# Patient Record
Sex: MALE | URBAN - METROPOLITAN AREA
[De-identification: names, ages, dates, MRNs, and addresses within clinical notes are randomized per-mention and may not be internally consistent; named-entity substitution may affect disease eponyms.]

---

## 2023-02-03 ENCOUNTER — APPOINTMENT (RX ONLY)
Dept: URBAN - METROPOLITAN AREA CLINIC 95 | Facility: CLINIC | Age: 83
Setting detail: DERMATOLOGY
End: 2023-02-03

## 2023-02-03 DIAGNOSIS — D492 NEOPLASM OF UNSPECIFIED NATURE OF BONE, SOFT TISSUE, AND SKIN: ICD-10-CM

## 2023-02-03 DIAGNOSIS — L21.8 OTHER SEBORRHEIC DERMATITIS: ICD-10-CM

## 2023-02-03 PROBLEM — R22.1 LOCALIZED SWELLING, MASS AND LUMP, NECK: Status: ACTIVE | Noted: 2023-02-03

## 2023-02-03 PROCEDURE — ? ORDER ULTRASOUND

## 2023-02-03 PROCEDURE — 99204 OFFICE O/P NEW MOD 45 MIN: CPT

## 2023-02-03 PROCEDURE — ? COUNSELING

## 2023-02-03 PROCEDURE — ? PRESCRIPTION

## 2023-02-03 RX ORDER — KETOCONAZOLE 20 MG/ML
SHAMPOO, SUSPENSION TOPICAL TIW
Qty: 120 | Refills: 3 | Status: ERX | COMMUNITY
Start: 2023-02-03

## 2023-02-03 RX ORDER — HYDROCORTISONE 25 MG/G
CREAM TOPICAL
Qty: 30 | Refills: 2 | Status: ERX | COMMUNITY
Start: 2023-02-03

## 2023-02-03 RX ADMIN — KETOCONAZOLE: 20 SHAMPOO, SUSPENSION TOPICAL at 00:00

## 2023-02-03 RX ADMIN — HYDROCORTISONE: 25 CREAM TOPICAL at 00:00

## 2023-02-03 ASSESSMENT — LOCATION ZONE DERM
LOCATION ZONE: NECK
LOCATION ZONE: SCALP
LOCATION ZONE: FACE

## 2023-02-03 ASSESSMENT — LOCATION SIMPLE DESCRIPTION DERM
LOCATION SIMPLE: LEFT SCALP
LOCATION SIMPLE: LEFT EYEBROW
LOCATION SIMPLE: POSTERIOR NECK

## 2023-02-03 ASSESSMENT — LOCATION DETAILED DESCRIPTION DERM
LOCATION DETAILED: LEFT LATERAL NECK
LOCATION DETAILED: LEFT MEDIAL FRONTAL SCALP
LOCATION DETAILED: LEFT CENTRAL EYEBROW

## 2023-02-03 NOTE — PROCEDURE: ORDER ULTRASOUND
Priority: normal
Provider: Curt Sanchez PA-C
Lesion Location: Left lateral neck
Subcutaneous Lesion Ultrasound Reason: Patient feels subcutaneous nodule on L lateral neck
Detail Level: Simple
Ultrasound Protocol: Ultrasound of Subcutaneous Mass

## 2023-03-17 ENCOUNTER — APPOINTMENT (RX ONLY)
Dept: URBAN - METROPOLITAN AREA CLINIC 95 | Facility: CLINIC | Age: 83
Setting detail: DERMATOLOGY
End: 2023-03-17

## 2023-03-17 DIAGNOSIS — L21.8 OTHER SEBORRHEIC DERMATITIS: ICD-10-CM

## 2023-03-17 DIAGNOSIS — D492 NEOPLASM OF UNSPECIFIED NATURE OF BONE, SOFT TISSUE, AND SKIN: ICD-10-CM

## 2023-03-17 PROBLEM — R22.1 LOCALIZED SWELLING, MASS AND LUMP, NECK: Status: ACTIVE | Noted: 2023-03-17

## 2023-03-17 PROCEDURE — ? COUNSELING

## 2023-03-17 PROCEDURE — ? PRESCRIPTION

## 2023-03-17 PROCEDURE — ? PRESCRIPTION MEDICATION MANAGEMENT

## 2023-03-17 PROCEDURE — 99214 OFFICE O/P EST MOD 30 MIN: CPT

## 2023-03-17 RX ORDER — MOMETASONE FUROATE 1 MG/ML
SOLUTION TOPICAL
Qty: 60 | Refills: 3 | Status: ERX | COMMUNITY
Start: 2023-03-17

## 2023-03-17 RX ADMIN — MOMETASONE FUROATE: 1 SOLUTION TOPICAL at 00:00

## 2023-03-17 ASSESSMENT — LOCATION SIMPLE DESCRIPTION DERM
LOCATION SIMPLE: LEFT SCALP
LOCATION SIMPLE: LEFT EYEBROW
LOCATION SIMPLE: POSTERIOR NECK

## 2023-03-17 ASSESSMENT — LOCATION ZONE DERM
LOCATION ZONE: FACE
LOCATION ZONE: SCALP
LOCATION ZONE: NECK

## 2023-03-17 ASSESSMENT — LOCATION DETAILED DESCRIPTION DERM
LOCATION DETAILED: LEFT LATERAL NECK
LOCATION DETAILED: LEFT CENTRAL EYEBROW
LOCATION DETAILED: LEFT MEDIAL FRONTAL SCALP

## 2023-03-17 NOTE — PROCEDURE: PRESCRIPTION MEDICATION MANAGEMENT
Detail Level: Zone
Continue Regimen: RX Ketoconazole 2% shampoo qod\\nRX HC 2.5% cream bid prn flares\\nStart RX Mometasone sol bid 2 weeks on 2 weeks off
Render In Strict Bullet Format?: No